# Patient Record
(demographics unavailable — no encounter records)

---

## 2024-11-05 NOTE — END OF VISIT
Left message for patient to return call to schedule    [Time Spent: ___ minutes] : I have spent [unfilled] minutes of time on the encounter which excludes teaching and separately reported services.

## 2024-11-08 NOTE — FAMILY HISTORY
[___ inches] : [unfilled] inches [de-identified] : measured; mother is from [place]  [FreeTextEntry1] : mother does not know but believes just slightly taller than her  [FreeTextEntry5] : mother cannot recall but believes "she was average" - about 14 y/o  [FreeTextEntry4] : MGM and MGF: "a little taller than mother"  [FreeTextEntry2] : none

## 2024-11-08 NOTE — REVIEW OF SYSTEMS
[Nl] : Neurological [Pubertal Concerns] : pubertal concerns [Change in Vision] : no change in vision  [Headache] : no headache [Cold Intolerance] : no intolerance to cold [Heat Intolerance] : no intolerance to heat [Polydypsia] : no polydipsia [Polyuria] : no polyuria

## 2024-11-08 NOTE — REASON FOR VISIT
[Follow-Up: _____] : a [unfilled] follow-up visit  [Patient] : patient [Mother] : mother [[employer] ] : provided by [employer]   [Interpreters_IDNumber] : [id number] [Interpreters_FullName] : Danyel [TWNoteComboBox1] : Ukrainian

## 2024-11-08 NOTE — ASSESSMENT
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   However, BA seems to have advanced by ~1 year in a matter of 7 months (driven by adrenarche? vs. CPP not fully suppressed?). Also today noted growth rate increased compared to prior visit  (grew 2 cm in the past 3.1 months ~ AGV 7.74 cm/year   BMI improved from last visit - in the normal range. Patient eating healthier   Overall, it is reassuring that on physical exam there is no progression of puberty.  However, growth rate has increased and BA is advancing relatively quickly indicating that puberty might not be fully suppressed.   -Advised 7-8 AM gonadotropins and estradiol to see if biochemically suppressed (to be done in the next 2 weeks)  -If suppressed, will keep the same dose of Leuprolide and re-evaluate at next visit.  If no suppression, consider increasing Leuprolide to 30 mg for next injection -RTC in 3 months for follow up and Lupron injection

## 2024-11-08 NOTE — DATA REVIEWED
[FreeTextEntry1] : MRI pituitary with and w/o IV contrast (4/11/23) The sella is normal in size. There is mild prominence of the posterior pituitary bright spot, therefore underlying small Rathke's cleft cyst cannot be excluded. There is no abnormal mass effect. There is no focal area of decreased enhancement within the pituitary gland.The infundibulum is midline, The suprasellar region, optic chiasm and cavernous sinuses are normal.   Review of Laboratory Evaluation 06/01/2022 CBC and lead - unremarkable   01/05/2023 09:51 [facility]  TSH 2.48 , fT4 1.2  Total Testo 8 (Prosper 1: 8 or less)  17 OHP 24 (<145)  Androstenedione 40 (6 y/o: < 48)  DHEAS 68 (7-10 y/o: 81)  LH <0.2, FSH 2.4 (LH and FSH not done using pediatric Assays),  Estradiol US 4 (<16)   02/08/2023 07:30 AM  LIpid Panel: , , HDL 54, TG 51, LDL 99  CMP: BG 79, normal AST , ALT ,  (117-311)  HgA1C 5.1%  LH 1.1, FSH 5.2 , Estradiol U/S 31  Insulin 5.4 (<18.4)   12/19/2023 LH 0.22 , FSH 1.10, Estradiol < 2   08/2024  Review of imaging 1/5/2023  CA 7 year 3.5 months, , BA 8 year 10 months as read by radiologist  I viewed the bone age and read it as:  U/R 8 yrs 10 mo, C :[id number], Ph 7 year 10 months  Using Marcel-Pinneau tables and height of 48.6'',  her predicted adult height is about 61.2-62.5'' (BA of 8 year 10 months and accelerated tables)  Discussed that height prediction is only based on most recent height and most recent bone age and thus can change depending on the rate of pubertal progression, growth, and bone age advancement  12/19/2023  CA 8 years 2 months, BA 12 y/o as read by radiology I viewed the bone age myself and read it as:  U/R 10 y/o, Carpals 10 y/o, Phalanges 8 years 10 months  Using Marcel-Pinneau tables, and height of 51.1'' , her predicted adult height is 61.6''.   Discussed that height prediction is only based on most recent height and most recent bone age and thus can change depending on the rate of pubertal progression, growth, and bone age advancement   7/23/2024 CA 8 years 10 months , BA 13 y/o as read by radiologist  I viewed the bone age and read it as  U/R 12 y/o, Carpals 12 y/o, Phlanges 10 y/o   04/11/2023 MRI pituitary with and w/o contrast Normal MRI - mild prominence of the posterior bright spot - underlying small Rathke's cleft cyst cannot be excluded   Review of Growth Chart from PMD at initial visit  Height : Height around 25th percentile with increase to between 50th and 75th percentile from 4 to 4.6 y/o  with linear growth between 50th and 75th percentile after that  Weight: Stable weight between 5th and 25th percentile between 2 and 5 y/o with acceleration between 4 and 6 y/o to 90th percentile and then stable weight after that

## 2024-11-08 NOTE — PAST MEDICAL HISTORY
[At Term] : at term [Normal Vaginal Route] : by normal vaginal route [None] : there were no delivery complications [Age Appropriate] : age appropriate developmental milestones met [FreeTextEntry1] : 6 lbs

## 2024-11-08 NOTE — HISTORY OF PRESENT ILLNESS
[Premenarchal] : premenarchal [FreeTextEntry2] : Flako is an 9yr1mo old female who presents for follow up of idiopathic CPP and premature adrenarche  Lupron initiated in 06/2023 (7 year 8 months)  Today here for follow up and lupron injection  Initial Presentation Review (01/2023, 7 years 3 months)  Mom reported that she noticed right breast enlargement at 7 year 2 months , no enlargement of left side noted  Noted pubic hair at 7 year 2 months Reported apocrine body onset  between 6 and 8 y/o Denied axillary hair  Denied acne Denied vaginal discharge  Denied rapid growth  Denied exposure to exogenous hormones  Mom did not know of any family members with early onset of puberty  Maternal menarche: cannot recall exactly but believes ~12 y/o   Last visit:08/2024  Since last visit: -No ER visits/hospitalizations/major illnesses -States no changes in breast size or pubic hair from last visit  -Grew 2 cm in the past 3.1 months ~ AGV 7.74 cm/year  -Denies vaginal discharge  -No issues with last injection   Patient has hx overweight:  Lost 1 lb from last visit  BMI improved from last visit 77%--> 68% Patient now only drinks water and eats sweets only for special occasions  Eats rice and beans (limits intake), does not like potatoes or pasta,  eats whole wheat bread   Physical activity: running around/jumping a lot at home; no organized physical activity

## 2024-11-08 NOTE — PHYSICAL EXAM
[Healthy Appearing] : healthy appearing [Interactive] : interactive [Normal Appearance] : normal appearance [Well formed] : well formed [WNL for age] : within normal limits of age [Normal S1 and S2] : normal S1 and S2 [Clear to Ausculation Bilaterally] : clear to auscultation bilaterally [Abdomen Soft] : soft [Abdomen Tenderness] : non-tender [] : no hepatosplenomegaly [Normal] : normal  [Dysmorphic] : non-dysmorphic [Hirsutism] : no hirsutism [Goiter] : no goiter [Murmur] : no murmurs [de-identified] : no AN, + single  small CALM with irregular borders on left thigh , [FreeTextEntry1] : well healed scars on abdomen with keloid formation s/p appendectomy  [de-identified] : Prosper 3 PH ,  no clitoromegaly , Breasts Prosper 2 bilaterally (feel softer than before)  , + moderate axillary hair b/l

## 2024-11-08 NOTE — CONSULT LETTER
[Dear  ___] : Dear  [unfilled], [Courtesy Letter:] : I had the pleasure of seeing your patient, [unfilled], in my office today. [Please see my note below.] : Please see my note below. [Consult Closing:] : Thank you very much for allowing me to participate in the care of this patient.  If you have any questions, please do not hesitate to contact me. [Sincerely,] : Sincerely, [FreeTextEntry3] : Nanette Mcfadden MD\par  Pediatric Endocrinology\par  [facility]\par

## 2024-11-08 NOTE — CONSULT LETTER
[Dear  ___] : Dear  [unfilled], [Courtesy Letter:] : I had the pleasure of seeing your patient, [unfilled], in my office today. [Please see my note below.] : Please see my note below. [Consult Closing:] : Thank you very much for allowing me to participate in the care of this patient.  If you have any questions, please do not hesitate to contact me. [Sincerely,] : Sincerely, [FreeTextEntry3] : Nanette Mcfadden MD\par  Pediatric Endocrinology\par  [facility]\par

## 2024-11-08 NOTE — REASON FOR VISIT
[Follow-Up: _____] : a [unfilled] follow-up visit  [Patient] : patient [Mother] : mother [[employer] ] : provided by [employer]   [Interpreters_IDNumber] : [id number] [Interpreters_FullName] : Danyel [TWNoteComboBox1] : Ghanaian

## 2024-11-08 NOTE — PHYSICAL EXAM
[Healthy Appearing] : healthy appearing [Interactive] : interactive [Normal Appearance] : normal appearance [Well formed] : well formed [WNL for age] : within normal limits of age [Normal S1 and S2] : normal S1 and S2 [Clear to Ausculation Bilaterally] : clear to auscultation bilaterally [Abdomen Soft] : soft [Abdomen Tenderness] : non-tender [] : no hepatosplenomegaly [Normal] : normal  [Dysmorphic] : non-dysmorphic [Hirsutism] : no hirsutism [Goiter] : no goiter [Murmur] : no murmurs [FreeTextEntry1] : well healed scars on abdomen with keloid formation s/p appendectomy  [de-identified] : no AN, + single  small CALM with irregular borders on left thigh , [de-identified] : Prosper 3 PH ,  no clitoromegaly , Breasts Prosper 2 bilaterally (feel softer than before)  , + moderate axillary hair b/l

## 2024-11-08 NOTE — DATA REVIEWED
[FreeTextEntry1] : MRI pituitary with and w/o IV contrast (4/11/23) The sella is normal in size. There is mild prominence of the posterior pituitary bright spot, therefore underlying small Rathke's cleft cyst cannot be excluded. There is no abnormal mass effect. There is no focal area of decreased enhancement within the pituitary gland.The infundibulum is midline, The suprasellar region, optic chiasm and cavernous sinuses are normal.   Review of Laboratory Evaluation 06/01/2022 CBC and lead - unremarkable   01/05/2023 09:51 [facility]  TSH 2.48 , fT4 1.2  Total Testo 8 (Prosper 1: 8 or less)  17 OHP 24 (<145)  Androstenedione 40 (6 y/o: < 48)  DHEAS 68 (7-10 y/o: 81)  LH <0.2, FSH 2.4 (LH and FSH not done using pediatric Assays),  Estradiol US 4 (<16)   02/08/2023 07:30 AM  LIpid Panel: , , HDL 54, TG 51, LDL 99  CMP: BG 79, normal AST , ALT ,  (117-311)  HgA1C 5.1%  LH 1.1, FSH 5.2 , Estradiol U/S 31  Insulin 5.4 (<18.4)   12/19/2023 LH 0.22 , FSH 1.10, Estradiol < 2   08/2024  Review of imaging 1/5/2023  CA 7 year 3.5 months, , BA 8 year 10 months as read by radiologist  I viewed the bone age and read it as:  U/R 8 yrs 10 mo, C [id number], Ph 7 year 10 months  Using Marcel-Pinneau tables and height of 48.6'',  her predicted adult height is about 61.2-62.5'' (BA of 8 year 10 months and accelerated tables)  Discussed that height prediction is only based on most recent height and most recent bone age and thus can change depending on the rate of pubertal progression, growth, and bone age advancement  12/19/2023  CA 8 years 2 months, BA 12 y/o as read by radiology I viewed the bone age myself and read it as:  U/R 10 y/o, Carpals 10 y/o, Phalanges 8 years 10 months  Using Marcel-Pinneau tables, and height of 51.1'' , her predicted adult height is 61.6''.   Discussed that height prediction is only based on most recent height and most recent bone age and thus can change depending on the rate of pubertal progression, growth, and bone age advancement   7/23/2024 CA 8 years 10 months , BA 11 y/o as read by radiologist  I viewed the bone age and read it as  U/R 12 y/o, Carpals 12 y/o, Phlanges 10 y/o   04/11/2023 MRI pituitary with and w/o contrast Normal MRI - mild prominence of the posterior bright spot - underlying small Rathke's cleft cyst cannot be excluded   Review of Growth Chart from PMD at initial visit  Height : Height around 25th percentile with increase to between 50th and 75th percentile from 4 to 4.4 y/o  with linear growth between 50th and 75th percentile after that  Weight: Stable weight between 5th and 25th percentile between 2 and 5 y/o with acceleration between 4 and 4 y/o to 90th percentile and then stable weight after that

## 2024-11-08 NOTE — FAMILY HISTORY
[___ inches] : [unfilled] inches [de-identified] : measured; mother is from [place]  [FreeTextEntry1] : mother does not know but believes just slightly taller than her  [FreeTextEntry5] : mother cannot recall but believes "she was average" - about 14 y/o  [FreeTextEntry4] : MGM and MGF: "a little taller than mother"  [FreeTextEntry2] : none

## 2024-11-17 NOTE — DATA REVIEWED
[FreeTextEntry1] : MRI pituitary with and w/o IV contrast (4/11/23) The sella is normal in size. There is mild prominence of the posterior pituitary bright spot, therefore underlying small Rathke's cleft cyst cannot be excluded. There is no abnormal mass effect. There is no focal area of decreased enhancement within the pituitary gland.The infundibulum is midline, The suprasellar region, optic chiasm and cavernous sinuses are normal.   Review of Laboratory Evaluation 06/01/2022 CBC and lead - unremarkable   01/05/2023 09:51 [facility]  TSH 2.48 , fT4 1.2  Total Testo 8 (Prosper 1: 8 or less)  17 OHP 24 (<145)  Androstenedione 40 (6 y/o: < 48)  DHEAS 68 (7-8 y/o: 81)  LH <0.2, FSH 2.4 (LH and FSH not done using pediatric Assays),  Estradiol US 4 (<16)   02/08/2023 07:30 AM  LIpid Panel: , , HDL 54, TG 51, LDL 99  CMP: BG 79, normal AST , ALT ,  (117-311)  HgA1C 5.1%  LH 1.1, FSH 5.2 , Estradiol U/S 31  Insulin 5.4 (<18.4)   12/19/2023 LH 0.22 , FSH 1.10, Estradiol < 2   08/16/2024 (2 weeks after Lupron injection) LH 0.37, FSH 1.4, Estradiol <2   Review of imaging 1/5/2023  CA 7 year 3.5 months, , BA 8 year 10 months as read by radiologist  I viewed the bone age and read it as:  U/R 8 yrs 10 mo, C [id number], Ph 7 year 10 months  Using Marcel-Pinneau tables and height of 48.6'',  her predicted adult height is about 61.2-62.5'' (BA of 8 year 10 months and accelerated tables)  Discussed that height prediction is only based on most recent height and most recent bone age and thus can change depending on the rate of pubertal progression, growth, and bone age advancement  12/19/2023  CA 8 years 2 months, BA 10 y/o as read by radiology I viewed the bone age myself and read it as:  U/R 10 y/o, Carpals 10 y/o, Phalanges 8 years 10 months  Using Marcel-Pinneau tables, and height of 51.1'' , her predicted adult height is 61.6''.   Discussed that height prediction is only based on most recent height and most recent bone age and thus can change depending on the rate of pubertal progression, growth, and bone age advancement   7/23/2024 CA 8 years 10 months , BA 13 y/o as read by radiologist  I viewed the bone age and read it as  U/R 10 y/o, Carpals 10 y/o, Phalanges 10 y/o   04/11/2023 MRI pituitary with and w/o contrast Normal MRI - mild prominence of the posterior bright spot - underlying small Rathke's cleft cyst cannot be excluded   Review of Growth Chart from PMD at initial visit  Height : Height around 25th percentile with increase to between 50th and 75th percentile from 4 to 4.6 y/o  with linear growth between 50th and 75th percentile after that  Weight: Stable weight between 5th and 25th percentile between 2 and 5 y/o with acceleration between 4 and 6 y/o to 90th percentile and then stable weight after that

## 2024-11-17 NOTE — REASON FOR VISIT
[Follow-Up: _____] : a [unfilled] follow-up visit  [Patient] : patient [Mother] : mother [[employer] ] : provided by [employer]   [Interpreters_IDNumber] : [id number] [Interpreters_FullName] : Danyel [TWNoteComboBox1] : Hong Konger

## 2024-11-17 NOTE — CONSULT LETTER
[Dear  ___] : Dear  [unfilled], [Courtesy Letter:] : I had the pleasure of seeing your patient, [unfilled], in my office today. [Please see my note below.] : Please see my note below. [Consult Closing:] : Thank you very much for allowing me to participate in the care of this patient.  If you have any questions, please do not hesitate to contact me. [Sincerely,] : Sincerely, [FreeTextEntry3] : Nanette Mcfadden MD\par  Pediatric Endocrinology\par  [facility]\par

## 2024-11-17 NOTE — HISTORY OF PRESENT ILLNESS
[Premenarchal] : premenarchal [FreeTextEntry2] : Flako is an 9yr1mo old female who presents for follow up of idiopathic CPP and premature adrenarche  Lupron initiated in 06/2023 (7 year 8 months)  Today here for follow up and lupron injection  Initial Presentation Review (01/2023, 7 years 3 months)  Mom reported that she noticed right breast enlargement at 7 year 2 months , no enlargement of left side noted  Noted pubic hair at 7 year 2 months Reported apocrine body onset  between 6 and 8 y/o Denied axillary hair  Denied acne Denied vaginal discharge  Denied rapid growth  Denied exposure to exogenous hormones  Mom did not know of any family members with early onset of puberty  Maternal menarche: cannot recall exactly but believes ~14 y/o   Last visit: 08/2024  Since last visit: -No ER visits/hospitalizations/major illnesses -States no changes in breast size or pubic hair from last visit  -Grew only 1 cm in the past 3 months (AGV 4 cm/year) -Denies vaginal discharge  -No issues with last injection   Patient has hx overweight:  Gained 5 lbs from last visit (BMI still normal around the 80th percentile)  Patient now only drinks water and eats sweets only for special occasions  Eats rice and beans (limits intake), does not like potatoes or pasta,  eats whole wheat bread   Physical activity: running around/jumping a lot at home; no organized physical activity

## 2024-11-17 NOTE — DATA REVIEWED
[FreeTextEntry1] : MRI pituitary with and w/o IV contrast (4/11/23) The sella is normal in size. There is mild prominence of the posterior pituitary bright spot, therefore underlying small Rathke's cleft cyst cannot be excluded. There is no abnormal mass effect. There is no focal area of decreased enhancement within the pituitary gland.The infundibulum is midline, The suprasellar region, optic chiasm and cavernous sinuses are normal.   Review of Laboratory Evaluation 06/01/2022 CBC and lead - unremarkable   01/05/2023 09:51 [facility]  TSH 2.48 , fT4 1.2  Total Testo 8 (Prosper 1: 8 or less)  17 OHP 24 (<145)  Androstenedione 40 (6 y/o: < 48)  DHEAS 68 (7-10 y/o: 81)  LH <0.2, FSH 2.4 (LH and FSH not done using pediatric Assays),  Estradiol US 4 (<16)   02/08/2023 07:30 AM  LIpid Panel: , , HDL 54, TG 51, LDL 99  CMP: BG 79, normal AST , ALT ,  (117-311)  HgA1C 5.1%  LH 1.1, FSH 5.2 , Estradiol U/S 31  Insulin 5.4 (<18.4)   12/19/2023 LH 0.22 , FSH 1.10, Estradiol < 2   08/16/2024 (2 weeks after Lupron injection) LH 0.37, FSH 1.4, Estradiol <2   Review of imaging 1/5/2023  CA 7 year 3.5 months, , BA 8 year 10 months as read by radiologist  I viewed the bone age and read it as:  U/R 8 yrs 10 mo, C [id number], Ph 7 year 10 months  Using Marcel-Pinneau tables and height of 48.6'',  her predicted adult height is about 61.2-62.5'' (BA of 8 year 10 months and accelerated tables)  Discussed that height prediction is only based on most recent height and most recent bone age and thus can change depending on the rate of pubertal progression, growth, and bone age advancement  12/19/2023  CA 8 years 2 months, BA 10 y/o as read by radiology I viewed the bone age myself and read it as:  U/R 10 y/o, Carpals 10 y/o, Phalanges 8 years 10 months  Using Marcel-Pinneau tables, and height of 51.1'' , her predicted adult height is 61.6''.   Discussed that height prediction is only based on most recent height and most recent bone age and thus can change depending on the rate of pubertal progression, growth, and bone age advancement   7/23/2024 CA 8 years 10 months , BA 13 y/o as read by radiologist  I viewed the bone age and read it as  U/R 10 y/o, Carpals 10 y/o, Phalanges 10 y/o   04/11/2023 MRI pituitary with and w/o contrast Normal MRI - mild prominence of the posterior bright spot - underlying small Rathke's cleft cyst cannot be excluded   Review of Growth Chart from PMD at initial visit  Height : Height around 25th percentile with increase to between 50th and 75th percentile from 4 to 4.6 y/o  with linear growth between 50th and 75th percentile after that  Weight: Stable weight between 5th and 25th percentile between 2 and 3 y/o with acceleration between 4 and 6 y/o to 90th percentile and then stable weight after that

## 2024-11-17 NOTE — ASSESSMENT
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 Grew 1 cm in the past 3 months (AGV 4 cm/year) with prior growth rate being on the faster side.   Did not repeat BW as advised 2 weeks prior to this visit.   BMI improved from last visit - in the normal range. Patient eating healthier   Overall, it is reassuring that on physical exam there is no progression of puberty.    -Advised 7-8 AM gonadotropins and estradiol to see if biochemically suppressed 2 weeks prior to next visit  -Leuprolide 11.25 mg given by SUAD Sykes today -RTC in 3 months for follow up and Lupron injection

## 2024-11-17 NOTE — FAMILY HISTORY
[___ inches] : [unfilled] inches [de-identified] : measured; mother is from [place]  [FreeTextEntry1] : mother does not know but believes just slightly taller than her  [FreeTextEntry5] : mother cannot recall but believes "she was average" - about 12 y/o  [FreeTextEntry4] : MGM and MGF: "a little taller than mother"  [FreeTextEntry2] : none

## 2024-11-17 NOTE — REASON FOR VISIT
[Follow-Up: _____] : a [unfilled] follow-up visit  [Patient] : patient [Mother] : mother [[employer] ] : provided by [employer]   [Interpreters_IDNumber] : [id number] [Interpreters_FullName] : Danyel [TWNoteComboBox1] : Congolese

## 2024-11-17 NOTE — PHYSICAL EXAM
[Healthy Appearing] : healthy appearing [Interactive] : interactive [Normal Appearance] : normal appearance [Well formed] : well formed [WNL for age] : within normal limits of age [Normal S1 and S2] : normal S1 and S2 [Clear to Ausculation Bilaterally] : clear to auscultation bilaterally [Abdomen Soft] : soft [Abdomen Tenderness] : non-tender [] : no hepatosplenomegaly [Normal] : normal  [Dysmorphic] : non-dysmorphic [Hirsutism] : no hirsutism [Goiter] : no goiter [Murmur] : no murmurs [de-identified] : no AN, + single  small CALM with irregular borders on left thigh , [FreeTextEntry1] : well healed scars on abdomen with keloid formation s/p appendectomy  [de-identified] : Prosper 3 PH ,  no clitoromegaly , Breasts Prosper 2 bilaterally (feel softer than before)  , + moderate axillary hair b/l

## 2024-11-17 NOTE — CONSULT LETTER
[Dear  ___] : Dear  [unfilled], [Courtesy Letter:] : I had the pleasure of seeing your patient, [unfilled], in my office today. [Please see my note below.] : Please see my note below. [Consult Closing:] : Thank you very much for allowing me to participate in the care of this patient.  If you have any questions, please do not hesitate to contact me. [Sincerely,] : Sincerely, [FreeTextEntry3] : Nanette Mcfadden MD\par  Pediatric Endocrinology\par  [facility]\par

## 2024-11-17 NOTE — PHYSICAL EXAM
[Healthy Appearing] : healthy appearing [Interactive] : interactive [Normal Appearance] : normal appearance [Well formed] : well formed [WNL for age] : within normal limits of age [Normal S1 and S2] : normal S1 and S2 [Clear to Ausculation Bilaterally] : clear to auscultation bilaterally [Abdomen Soft] : soft [Abdomen Tenderness] : non-tender [] : no hepatosplenomegaly [Normal] : normal  [Dysmorphic] : non-dysmorphic [Hirsutism] : no hirsutism [Goiter] : no goiter [Murmur] : no murmurs [de-identified] : no AN, + single  small CALM with irregular borders on left thigh , [FreeTextEntry1] : well healed scars on abdomen with keloid formation s/p appendectomy  [de-identified] : Prosper 3 PH ,  no clitoromegaly , Breasts Prosper 2 bilaterally (feel softer than before)  , + moderate axillary hair b/l

## 2024-11-17 NOTE — FAMILY HISTORY
[___ inches] : [unfilled] inches [de-identified] : measured; mother is from [place]  [FreeTextEntry1] : mother does not know but believes just slightly taller than her  [FreeTextEntry5] : mother cannot recall but believes "she was average" - about 12 y/o  [FreeTextEntry4] : MGM and MGF: "a little taller than mother"  [FreeTextEntry2] : none

## 2025-02-27 NOTE — PHYSICAL EXAM
[Healthy Appearing] : healthy appearing [Interactive] : interactive [Normal Appearance] : normal appearance [Well formed] : well formed [WNL for age] : within normal limits of age [Normal S1 and S2] : normal S1 and S2 [Clear to Ausculation Bilaterally] : clear to auscultation bilaterally [Abdomen Soft] : soft [Abdomen Tenderness] : non-tender [] : no hepatosplenomegaly [Normal] : normal  [Dysmorphic] : non-dysmorphic [Hirsutism] : no hirsutism [Goiter] : no goiter [Murmur] : no murmurs [de-identified] : no AN, + single  small CALM with irregular borders on left thigh , [FreeTextEntry1] : well healed scars on abdomen with keloid formation s/p appendectomy  [de-identified] : Prosper 3 PH ,  no clitoromegaly , Breasts Prosper 2 bilaterally (feel softer than before)  , + moderate axillary hair b/l

## 2025-02-27 NOTE — PHYSICAL EXAM
[Healthy Appearing] : healthy appearing [Interactive] : interactive [Normal Appearance] : normal appearance [Well formed] : well formed [WNL for age] : within normal limits of age [Normal S1 and S2] : normal S1 and S2 [Clear to Ausculation Bilaterally] : clear to auscultation bilaterally [Abdomen Soft] : soft [Abdomen Tenderness] : non-tender [] : no hepatosplenomegaly [Normal] : normal  [Dysmorphic] : non-dysmorphic [Hirsutism] : no hirsutism [Goiter] : no goiter [Murmur] : no murmurs [de-identified] : no AN, + single  small CALM with irregular borders on left thigh , [FreeTextEntry1] : well healed scars on abdomen with keloid formation s/p appendectomy  [de-identified] : Prosper 3 PH ,  no clitoromegaly , Breasts Prosper 2 bilaterally (feel softer than before)  , + moderate axillary hair b/l

## 2025-02-27 NOTE — REASON FOR VISIT
[Follow-Up: _____] : a [unfilled] follow-up visit  [Patient] : patient [Mother] : mother [Other: ______] : provided by [facility] [TWNoteComboBox1] : Macedonian

## 2025-02-27 NOTE — HISTORY OF PRESENT ILLNESS
[Premenarchal] : premenarchal [FreeTextEntry2] : Flako is an 9yr5mo old female who presents for follow up of idiopathic CPP and premature adrenarche  Lupron initiated in 06/2023 (7 year 8 months)  Today here for follow up   Initial Presentation Review (01/2023, 7 years 3 months)  Mom reported that she noticed right breast enlargement at 7 year 2 months , no enlargement of left side noted  Noted pubic hair at 7 year 2 months Reported apocrine body onset  between 6 and 6 y/o Denied axillary hair  Denied acne Denied vaginal discharge  Denied rapid growth  Denied exposure to exogenous hormones  Mom did not know of any family members with early onset of puberty  Maternal menarche: cannot recall exactly but believes ~12 y/o   Last visit: 11/2024  Since last visit: -No ER visits/hospitalizations -In the past two weeks flu like symptoms one an off; 2 days ago had fever 103.  Mom states that she does not want Saulo to get the Lupron injection today since she is not feeling well.  Agreeble to come in next week for the injection.    -States no changes in breast size or pubic hair from last visit; denies vaginal discharge -Grew 1.7 cm in the past 3.5 months (AGV 5.8 cm/year) -Denies vaginal discharge -No issues with last injection   Patient has hx overweight:  Gained 1 lbs from last visit (BMI normal)  Patient now only drinks water and eats sweets only for special occasions  Eats rice and beans (limits intake), does not like potatoes or pasta,  eats whole wheat bread   Physical activity: running around/jumping a lot at home; no organized physical activity

## 2025-02-27 NOTE — CONSULT LETTER
[Dear  ___] : Dear  [unfilled], [Courtesy Letter:] : I had the pleasure of seeing your patient, [unfilled], in my office today. [Please see my note below.] : Please see my note below. [Consult Closing:] : Thank you very much for allowing me to participate in the care of this patient.  If you have any questions, please do not hesitate to contact me. [Sincerely,] : Sincerely, [FreeTextEntry3] : Nanette Mcfadden MD\par  Pediatric Endocrinology\par  [facility]\par

## 2025-02-27 NOTE — CONSULT LETTER
[Dear  ___] : Dear  [unfilled], [Courtesy Letter:] : I had the pleasure of seeing your patient, [unfilled], in my office today. [Please see my note below.] : Please see my note below. [Consult Closing:] : Thank you very much for allowing me to participate in the care of this patient.  If you have any questions, please do not hesitate to contact me. [Sincerely,] : Sincerely, [FreeTextEntry3] : Nanette Mcfadden MD\par  Pediatric Endocrinology\par  [facility]\par

## 2025-02-27 NOTE — FAMILY HISTORY
[___ inches] : [unfilled] inches [de-identified] : measured; mother is from [place]  [FreeTextEntry1] : mother does not know but believes just slightly taller than her  [FreeTextEntry5] : mother cannot recall but believes "she was average" - about 14 y/o  [FreeTextEntry4] : MGM and MGF: "a little taller than mother"  [FreeTextEntry2] : none

## 2025-02-27 NOTE — DATA REVIEWED
[FreeTextEntry1] : MRI pituitary with and w/o IV contrast (4/11/23) The sella is normal in size. There is mild prominence of the posterior pituitary bright spot, therefore underlying small Rathke's cleft cyst cannot be excluded. There is no abnormal mass effect. There is no focal area of decreased enhancement within the pituitary gland.The infundibulum is midline, The suprasellar region, optic chiasm and cavernous sinuses are normal.   Review of Laboratory Evaluation 06/01/2022 CBC and lead - unremarkable   01/05/2023 09:51 [facility]  TSH 2.48 , fT4 1.2  Total Testo 8 (Prosper 1: 8 or less)  17 OHP 24 (<145)  Androstenedione 40 (6 y/o: < 48)  DHEAS 68 (7-10 y/o: 81)  LH <0.2, FSH 2.4 (LH and FSH not done using pediatric Assays),  Estradiol US 4 (<16)   02/08/2023 07:30 AM  LIpid Panel: , , HDL 54, TG 51, LDL 99  CMP: BG 79, normal AST , ALT ,  (117-311)  HgA1C 5.1%  LH 1.1, FSH 5.2 , Estradiol U/S 31  Insulin 5.4 (<18.4)   12/19/2023 LH 0.22 , FSH 1.10, Estradiol < 2   08/16/2024 (2 weeks after Lupron injection) LH 0.37, FSH 1.4, Estradiol <2   01/29/2024 07:19 LH 0.3, FSH 1.0 (LH and FSH not done by Pediatric Assay) , Estradiol <2    Review of imaging 1/5/2023  CA 7 year 3.5 months, , BA 8 year 10 months as read by radiologist  I viewed the bone age and read it as:  U/R 8 yrs 10 mo, C [id number], Ph 7 year 10 months  Using Marcel-Pinneau tables and height of 48.6'',  her predicted adult height is about 61.2-62.5'' (BA of 8 year 10 months and accelerated tables)  Discussed that height prediction is only based on most recent height and most recent bone age and thus can change depending on the rate of pubertal progression, growth, and bone age advancement  12/19/2023  CA 8 years 2 months, BA 10 y/o as read by radiology I viewed the bone age myself and read it as:  U/R 10 y/o, Carpals 10 y/o, Phalanges 8 years 10 months  Using Marcel-Pinneau tables, and height of 51.1'' , her predicted adult height is 61.6''.   Discussed that height prediction is only based on most recent height and most recent bone age and thus can change depending on the rate of pubertal progression, growth, and bone age advancement   7/23/2024 CA 8 years 10 months , BA 13 y/o as read by radiologist  I viewed the bone age and read it as  U/R 10 y/o, Carpals 10 y/o, Phalanges 10 y/o   02/10/2025 Bone age CA 9 years 5 months, BA 13 y/o  04/11/2023 MRI pituitary with and w/o contrast Normal MRI - mild prominence of the posterior bright spot - underlying small Rathke's cleft cyst cannot be excluded   Review of Growth Chart from PMD at initial visit  Height : Height around 25th percentile with increase to between 50th and 75th percentile from 4 to 4.4 y/o  with linear growth between 50th and 75th percentile after that  Weight: Stable weight between 5th and 25th percentile between 2 and 5 y/o with acceleration between 4 and 4 y/o to 90th percentile and then stable weight after that

## 2025-02-27 NOTE — ASSESSMENT
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  Grew 1.7 cm in the past 3.5 months (AGV 5.8 cm/year)  BMI normal Patient eating healthier   Mom wants to hold off on Leuprolide today as patient is sick ---> can be given next week   -Need to review patient's bone age from 02/2025 and call her with results   -Leuprolide 11.25 mg to given by SUAD Sykes next Wednesday (patient scheduled for 2 PM injection)  -RTC next week for Lupron injection ---> at that time to schedule a 3 months follow up

## 2025-02-27 NOTE — FAMILY HISTORY
[___ inches] : [unfilled] inches [de-identified] : measured; mother is from [place]  [FreeTextEntry1] : mother does not know but believes just slightly taller than her  [FreeTextEntry5] : mother cannot recall but believes "she was average" - about 12 y/o  [FreeTextEntry4] : MGM and MGF: "a little taller than mother"  [FreeTextEntry2] : none

## 2025-02-27 NOTE — HISTORY OF PRESENT ILLNESS
[Premenarchal] : premenarchal [FreeTextEntry2] : Flako is an 9yr5mo old female who presents for follow up of idiopathic CPP and premature adrenarche  Lupron initiated in 06/2023 (7 year 8 months)  Today here for follow up   Initial Presentation Review (01/2023, 7 years 3 months)  Mom reported that she noticed right breast enlargement at 7 year 2 months , no enlargement of left side noted  Noted pubic hair at 7 year 2 months Reported apocrine body onset  between 6 and 8 y/o Denied axillary hair  Denied acne Denied vaginal discharge  Denied rapid growth  Denied exposure to exogenous hormones  Mom did not know of any family members with early onset of puberty  Maternal menarche: cannot recall exactly but believes ~14 y/o   Last visit: 11/2024  Since last visit: -No ER visits/hospitalizations -In the past two weeks flu like symptoms one an off; 2 days ago had fever 103.  Mom states that she does not want Saulo to get the Lupron injection today since she is not feeling well.  Agreeble to come in next week for the injection.    -States no changes in breast size or pubic hair from last visit; denies vaginal discharge -Grew 1.7 cm in the past 3.5 months (AGV 5.8 cm/year) -Denies vaginal discharge -No issues with last injection   Patient has hx overweight:  Gained 1 lbs from last visit (BMI normal)  Patient now only drinks water and eats sweets only for special occasions  Eats rice and beans (limits intake), does not like potatoes or pasta,  eats whole wheat bread   Physical activity: running around/jumping a lot at home; no organized physical activity

## 2025-02-27 NOTE — DATA REVIEWED
[FreeTextEntry1] : MRI pituitary with and w/o IV contrast (4/11/23) The sella is normal in size. There is mild prominence of the posterior pituitary bright spot, therefore underlying small Rathke's cleft cyst cannot be excluded. There is no abnormal mass effect. There is no focal area of decreased enhancement within the pituitary gland.The infundibulum is midline, The suprasellar region, optic chiasm and cavernous sinuses are normal.   Review of Laboratory Evaluation 06/01/2022 CBC and lead - unremarkable   01/05/2023 09:51 [facility]  TSH 2.48 , fT4 1.2  Total Testo 8 (Prosper 1: 8 or less)  17 OHP 24 (<145)  Androstenedione 40 (6 y/o: < 48)  DHEAS 68 (7-10 y/o: 81)  LH <0.2, FSH 2.4 (LH and FSH not done using pediatric Assays),  Estradiol US 4 (<16)   02/08/2023 07:30 AM  LIpid Panel: , , HDL 54, TG 51, LDL 99  CMP: BG 79, normal AST , ALT ,  (117-311)  HgA1C 5.1%  LH 1.1, FSH 5.2 , Estradiol U/S 31  Insulin 5.4 (<18.4)   12/19/2023 LH 0.22 , FSH 1.10, Estradiol < 2   08/16/2024 (2 weeks after Lupron injection) LH 0.37, FSH 1.4, Estradiol <2   01/29/2024 07:19 LH 0.3, FSH 1.0 (LH and FSH not done by Pediatric Assay) , Estradiol <2    Review of imaging 1/5/2023  CA 7 year 3.5 months, , BA 8 year 10 months as read by radiologist  I viewed the bone age and read it as:  U/R 8 yrs 10 mo, C [id number], Ph 7 year 10 months  Using Marcel-Pinneau tables and height of 48.6'',  her predicted adult height is about 61.2-62.5'' (BA of 8 year 10 months and accelerated tables)  Discussed that height prediction is only based on most recent height and most recent bone age and thus can change depending on the rate of pubertal progression, growth, and bone age advancement  12/19/2023  CA 8 years 2 months, BA 10 y/o as read by radiology I viewed the bone age myself and read it as:  U/R 10 y/o, Carpals 10 y/o, Phalanges 8 years 10 months  Using Marcel-Pinneau tables, and height of 51.1'' , her predicted adult height is 61.6''.   Discussed that height prediction is only based on most recent height and most recent bone age and thus can change depending on the rate of pubertal progression, growth, and bone age advancement   7/23/2024 CA 8 years 10 months , BA 13 y/o as read by radiologist  I viewed the bone age and read it as  U/R 10 y/o, Carpals 10 y/o, Phalanges 10 y/o   02/10/2025 Bone age CA 9 years 5 months, BA 13 y/o  04/11/2023 MRI pituitary with and w/o contrast Normal MRI - mild prominence of the posterior bright spot - underlying small Rathke's cleft cyst cannot be excluded   Review of Growth Chart from PMD at initial visit  Height : Height around 25th percentile with increase to between 50th and 75th percentile from 4 to 4.6 y/o  with linear growth between 50th and 75th percentile after that  Weight: Stable weight between 5th and 25th percentile between 2 and 5 y/o with acceleration between 4 and 6 y/o to 90th percentile and then stable weight after that

## 2025-02-27 NOTE — REASON FOR VISIT
[Follow-Up: _____] : a [unfilled] follow-up visit  [Patient] : patient [Mother] : mother [Other: ______] : provided by SAQIB [TWNoteComboBox1] : [place]

## 2025-06-11 NOTE — REVIEW OF SYSTEMS
[Nl] : Neurological [Pubertal Concerns] : pubertal concerns [Change in Vision] : no change in vision  [Cold Intolerance] : no intolerance to cold [Headache] : no headache [Heat Intolerance] : no intolerance to heat [Polydypsia] : no polydipsia [Polyuria] : no polyuria

## 2025-06-11 NOTE — ASSESSMENT
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  Grew 1.7 cm in the past 3.5 months (AGV 5.8 cm/year)  BMI normal Patient eating healthier   Mom wants to hold off on Leuprolide today as patient is sick ---> can be given next week   -Leuprolide 11.25 mg to given by SUAD Sykes next Wednesday (patient scheduled for 2 PM injection)  -RTC next week for Lupron injection ---> at that time to schedule a 3 months follow up

## 2025-06-11 NOTE — CONSULT LETTER
[Dear  ___] : Dear  [unfilled], [Courtesy Letter:] : I had the pleasure of seeing your patient, [unfilled], in my office today. [Consult Closing:] : Thank you very much for allowing me to participate in the care of this patient.  If you have any questions, please do not hesitate to contact me. [Please see my note below.] : Please see my note below. [Sincerely,] : Sincerely, [FreeTextEntry3] : Nanette Mcfadden MD\par  Pediatric Endocrinology\par  [facility]\par

## 2025-06-11 NOTE — REASON FOR VISIT
[Follow-Up: _____] : a [unfilled] follow-up visit  [Patient] : patient [Mother] : mother [Other: ______] : provided by SAQIB [Interpreters_IDNumber] : [id number] [Interpreters_FullName] : [place] [TWNoteComboBox1] : [place]

## 2025-06-11 NOTE — HISTORY OF PRESENT ILLNESS
[Premenarchal] : premenarchal [FreeTextEntry2] : Flako is an 9yr8mo old female who presents for follow up of idiopathic CPP and premature adrenarche  Lupron initiated in 06/2023 (7 year 8 months)  Today here for follow up and Leuprolide injection - Last injection 03/05/2025  Initial Presentation Review (01/2023, 7 years 3 months)  Mom reported that she noticed right breast enlargement at 7 year 2 months , no enlargement of left side noted  Noted pubic hair at 7 year 2 months Reported apocrine body onset  between 6 and 6 y/o Denied axillary hair  Denied acne Denied vaginal discharge  Denied rapid growth  Denied exposure to exogenous hormones  Mom did not know of any family members with early onset of puberty  Maternal menarche: cannot recall exactly but believes ~12 y/o   Last visit: 02/2025 Last Leuprolide Injection 03/05/2025  Since last visit: -No ER visits/hospitalizations -No issues with last injection    -States no changes in breast size or pubic hair from last visit; denies vaginal discharge -Grew 1.7 cm in the past 3.5 months (AGV 5.8 cm/year) -Denies vaginal discharge -No issues with last injection   Patient has hx overweight:  Gained 1 lbs from last visit (BMI normal)  Patient now only drinks water and eats sweets only for special occasions  Eats rice and beans (limits intake), does not like potatoes or pasta,  eats whole wheat bread   Physical activity: running around/jumping a lot at home; no organized physical activity

## 2025-06-11 NOTE — PHYSICAL EXAM
[Healthy Appearing] : healthy appearing [Interactive] : interactive [Normal Appearance] : normal appearance [Well formed] : well formed [WNL for age] : within normal limits of age [Normal S1 and S2] : normal S1 and S2 [Clear to Ausculation Bilaterally] : clear to auscultation bilaterally [Abdomen Soft] : soft [] : no hepatosplenomegaly [Abdomen Tenderness] : non-tender [Normal] : normal  [Dysmorphic] : non-dysmorphic [Hirsutism] : no hirsutism [Goiter] : no goiter [Murmur] : no murmurs [de-identified] : no AN, + single  small CALM with irregular borders on left thigh , [de-identified] : Prosper 3 PH ,  no clitoromegaly , Breasts Prosper 2 bilaterally (feel softer than before)  , + moderate axillary hair b/l  [FreeTextEntry1] : well healed scars on abdomen with keloid formation s/p appendectomy

## 2025-06-11 NOTE — DATA REVIEWED
[FreeTextEntry1] : MRI pituitary with and w/o IV contrast (4/11/23) The sella is normal in size. There is mild prominence of the posterior pituitary bright spot, therefore underlying small Rathke's cleft cyst cannot be excluded. There is no abnormal mass effect. There is no focal area of decreased enhancement within the pituitary gland.The infundibulum is midline, The suprasellar region, optic chiasm and cavernous sinuses are normal.   Review of Laboratory Evaluation 06/01/2022 CBC and lead - unremarkable   01/05/2023 09:51 [facility]  TSH 2.48 , fT4 1.2  Total Testo 8 (Prosper 1: 8 or less)  17 OHP 24 (<145)  Androstenedione 40 (6 y/o: < 48)  DHEAS 68 (7-10 y/o: 81)  LH <0.2, FSH 2.4 (LH and FSH not done using pediatric Assays),  Estradiol US 4 (<16)   02/08/2023 07:30 AM  LIpid Panel: , , HDL 54, TG 51, LDL 99  CMP: BG 79, normal AST , ALT ,  (117-311)  HgA1C 5.1%  LH 1.1, FSH 5.2 , Estradiol U/S 31  Insulin 5.4 (<18.4)   12/19/2023 LH 0.22 , FSH 1.10, Estradiol < 2   08/16/2024 (2 weeks after Lupron injection) LH 0.37, FSH 1.4, Estradiol <2   01/29/2024 07:19 LH 0.3, FSH 1.0 (LH and FSH not done by Pediatric Assay) , Estradiol <2    Review of imaging 1/5/2023  CA 7 year 3.5 months, , BA 8 year 10 months as read by radiologist  I viewed the bone age and read it as:  U/R 8 yrs 10 mo, C [id number], Ph 7 year 10 months  Using Marcel-Pinneau tables and height of 48.6'',  her predicted adult height is about 61.2-62.5'' (BA of 8 year 10 months and accelerated tables)  Discussed that height prediction is only based on most recent height and most recent bone age and thus can change depending on the rate of pubertal progression, growth, and bone age advancement  12/19/2023  CA 8 years 2 months, BA 12 y/o as read by radiology I viewed the bone age myself and read it as:  U/R 10 y/o, Carpals 10 y/o, Phalanges 8 years 10 months  Using Marcel-Pinneau tables, and height of 51.1'' , her predicted adult height is 61.6''.   Discussed that height prediction is only based on most recent height and most recent bone age and thus can change depending on the rate of pubertal progression, growth, and bone age advancement   7/23/2024 CA 8 years 10 months , BA 11 y/o as read by radiologist  I viewed the bone age and read it as  U/R 12 y/o, Carpals 12 y/o, Phalanges 10 y/o   02/10/2025 Bone age CA 9 years 5 months, BA 11 y/o as read by radiologist I viewed the bone age and read it as:  U/R: 12 y/o Carpals: 11-11 y/o Phalanges: 11-11 y/o Using Marcel-Pinneau tables, his predicted adult height is about 58.8-59.9''  Discussed that height prediction is only based on most recent height and most recent bone age and thus can change depending on the rate of pubertal progression, growth, and bone age advancement   04/11/2023 MRI pituitary with and w/o contrast Normal MRI - mild prominence of the posterior bright spot - underlying small Rathke's cleft cyst cannot be excluded   Review of Growth Chart from PMD at initial visit  Height : Height around 25th percentile with increase to between 50th and 75th percentile from 4 to 4.6 y/o  with linear growth between 50th and 75th percentile after that  Weight: Stable weight between 5th and 25th percentile between 2 and 5 y/o with acceleration between 4 and 6 y/o to 90th percentile and then stable weight after that

## 2025-06-11 NOTE — FAMILY HISTORY
[___ inches] : [unfilled] inches [de-identified] : measured; mother is from [place]  [FreeTextEntry5] : mother cannot recall but believes "she was average" - about 14 y/o  [FreeTextEntry1] : mother does not know but believes just slightly taller than her  [FreeTextEntry2] : none [FreeTextEntry4] : MGM and MGF: "a little taller than mother"